# Patient Record
Sex: MALE | Race: WHITE | Employment: OTHER | URBAN - METROPOLITAN AREA
[De-identification: names, ages, dates, MRNs, and addresses within clinical notes are randomized per-mention and may not be internally consistent; named-entity substitution may affect disease eponyms.]

---

## 2017-01-01 ENCOUNTER — HOSPITAL ENCOUNTER (OUTPATIENT)
Dept: PHYSICAL THERAPY | Age: 72
Discharge: HOME | End: 2017-08-29
Payer: MEDICARE

## 2017-01-01 PROCEDURE — G8980 MOBILITY D/C STATUS: HCPCS

## 2017-01-01 PROCEDURE — 97165 OT EVAL LOW COMPLEX 30 MIN: CPT

## 2017-01-01 PROCEDURE — G8979 MOBILITY GOAL STATUS: HCPCS

## 2017-01-01 PROCEDURE — G8978 MOBILITY CURRENT STATUS: HCPCS

## 2017-05-17 PROBLEM — I50.9 CONGESTIVE HEART DISEASE (HCC): Status: ACTIVE | Noted: 2017-01-01

## 2017-05-17 PROBLEM — I50.9 CHF (CONGESTIVE HEART FAILURE) (HCC): Status: ACTIVE | Noted: 2017-01-01

## 2017-05-17 PROBLEM — R07.9 CHEST PAIN: Status: ACTIVE | Noted: 2017-01-01

## 2017-08-04 PROBLEM — I50.9 CONGESTIVE HEART FAILURE (CHF) (HCC): Status: ACTIVE | Noted: 2017-01-01

## 2017-08-29 NOTE — PROGRESS NOTES
[facility]  [facility] [address]  [phone] fax    Plan of Care/Statement of Necessity for Occupational Therapy Services    Patient name: Sueellen Lanes Start of Care: 2017   Referral source: Scottie Rubio MD : 1945    Medical Diagnosis: Decreased mobility [R26.89]  Chronic obstructive pulmonary disease, unspecified [J44.9]  Type 2 diabetes mellitus with hyperglycemia [E11.65]   Onset Date:    Treatment Diagnosis: Difficulty walking   Prior Hospitalization: see medical history Provider#: [id number]   Medications: Verified on Patient summary List    Comorbidities: CHF, COPD, Gout, arthritis R knee,    Prior Level of Function: Declining mobility due to increasing heart disease and COPD          The Plan of Care and following information is based on the information from the initial evaluation. Assessment/ key information: 74year old RHD male with long history of cardiac disease with multiple MIs and current ejection fraction of 30-35. He additionally has COPD as well as arthritis in R knee for which he has received steroid shots without relief. He is not a candidate for surgery due to cardiac disease. He has gout in B hands and wrists and well as ankles which flares at times. His current pain is 9/10 in knee, 3/10 in shoulders and elbows, and wrist/hand pain which can be 10/10 when gout strikes. He has limited knee extension in r knee to -25. His UE strength is 3-3+/5 but  is poor at R 30#, and L 20#. His lower extremity strength is 2+-3/5. He has difficulty coming to stand from seated position. He currently uses a rollator but becomes short of breath and unstable after 5-10 feet. His gait is antalgic and his knee gives way despite bracing. He has difficulty getting to bathroom in timely manner due to pain and shortness of breath as well as difficulty coming to stand from rollator.   He is no longer safe in ambulation with any device due to pain, weakness in all extremities, dyspnea and cardiac disease. He cannot push a manual wheelchair due to strength deficits and cardiopulmonary disease as well as obesity. He cannot use a scooter as keeping his arms at shoulder level increases cardiac and pulmonary demands on an already stressed system. He also has gout and limited  strength which makes use of scooter controls difficult. He requires a power wheelchair to allow him to move through his home safely and independently to retrieve items for dressing, prepare meals, use bathroom for bathing and toileting and perform routine household chores such as washing dishes and doing laundry. Without a power wheelchair he will continue to have difficulty getting to the bathroom in time, and will rely on an unsafe office chair with no brakes to gather items for dressing and bathing and move about for home care tasks as he cannot tolerate standing and the rollator cannot be used to move in sitting well.       Evaluation Complexity: History LOW Complexity : Brief history review  Examination LOW Complexity : 1-3 performance deficits relating to physical, cognitive , or psychosocial skils that result in activity limitations and / or participation restrictions  Clinical Decision Making LOW Complexity : No comorbidities that affect functional and no verbal or physical assistance needed to complete eval tasks   Overall Complexity Rating: LOW     Problem List: Pain effecting function, Decreased range of motion, Decreased strength, Decreased ADL/functional abilities , Decreased activity tolerance and Decreased transfer abilities     Treatment Plan may include any combination of the following: Patient education    Patient / Family readiness to learn indicated by: asking questions, trying to perform skills and interest    Persons(s) to be included in education:   patient (P)    Barriers to Learning/Limitations: None    Patient Goal (s): Be able to get around    Patient Self Reported Health Status: poor    Rehabilitation Potential: fair    Short Term Goals: To be accomplished in 1 treatments:  Evaluate patient for need for power mobility and make recommendations    Frequency / Duration: Patient to be seen 1 times per week for 1 treatments:    Patient/ Caregiver education and instruction: Diagnosis, prognosis, other order process  [x]  Plan of care has been reviewed with [facility]    Mobility   Current  CL= 60-79%   Goal  CL= 60-79%  D/C  CL= 60-79%      The severity rating is based on clinical judgment and the FOTO score. Certification Period: 8/29/17    Donnice Lundborg, OTR/L 8/29/2017 1:46 PM      ________________________________________________________________________    I certify that the above Therapy Services are being furnished while the patient is under my care. I agree with the treatment plan and certify that this therapy is necessary.     Physician's Signature:____________________Date:____________Time:__________    Please sign and return to [facility]  [facility]   [address] [place] [address]  [phone] fax

## 2017-08-29 NOTE — PROGRESS NOTES
OT DAILY TREATMENT NOTE - [facility]     Patient Name: Denzel Del Valle  Date:2017  : 1945  [x]  Patient  Verified  Payor: Alba Oviedo / Plan: [address] [place] [address] HMO / Product Type: Managed Care Medicare /    In time:100  Out time:125  Total Treatment Time (min): 25  Total Timed Codes (min): 0  1:1 Treatment Time ( [address] only): 25   Visit #: 1 of 1    Treatment Area: Decreased mobility [R26.89]  Chronic obstructive pulmonary disease, unspecified [J44.9]  Type 2 diabetes mellitus with hyperglycemia [E11.65]    SUBJECTIVE  Pain Level (0-10 scale): 9/10  Any medication changes, allergies to medications, adverse drug reactions, diagnosis change, or new procedure performed?: [x] No    [] Yes (see summary sheet for update)  Subjective functional status/changes:   [] No changes reported  My knee hurts all the time, but goes up to a 9/10 when I stand    OBJECTIVE  Patient seen for mobility evaluation only    With   [] TE   [] TA   [] neuro   [] other: Patient Education: [x] Review HEP    [] Progressed/Changed HEP based on:   [] positioning   [] body mechanics   [] transfers   [] heat/ice application   [] Splint wear/care   [] Sensory re-education   [] scar management      [] other: order process           Other Objective/Functional Measures:   See mobility eval     Pain Level (0-10 scale) post treatment: 910    ASSESSMENT/Changes in Function:          [x]  See Plan of Care  []  See progress note/recertification  []  See Discharge Summary           PLAN  []  Upgrade activities as tolerated     []  Continue plan of care  []  Update interventions per flow sheet       []  Discharge due to:_  []  Other:_      THANIA Ambrose/L 2017  1:43 PM    No future appointments.

## 2017-09-21 PROBLEM — R06.00 DYSPNEA: Status: ACTIVE | Noted: 2017-01-01

## 2017-09-21 PROBLEM — R77.8 ELEVATED TROPONIN: Status: ACTIVE | Noted: 2017-01-01

## 2017-09-21 PROBLEM — I50.9 ACUTE EXACERBATION OF CHF (CONGESTIVE HEART FAILURE) (HCC): Status: ACTIVE | Noted: 2017-01-01

## 2017-09-21 PROBLEM — I50.9 CHF EXACERBATION (HCC): Status: ACTIVE | Noted: 2017-01-01

## 2017-11-13 PROBLEM — J96.01 ACUTE HYPOXEMIC RESPIRATORY FAILURE (HCC): Status: ACTIVE | Noted: 2017-01-01

## 2017-12-06 PROBLEM — J96.01 ACUTE RESPIRATORY FAILURE WITH HYPOXIA (HCC): Status: ACTIVE | Noted: 2017-01-01

## 2017-12-06 PROBLEM — I50.9 ACUTE CONGESTIVE HEART FAILURE (HCC): Status: ACTIVE | Noted: 2017-01-01

## 2017-12-06 PROBLEM — N17.9 ACUTE KIDNEY INJURY (HCC): Status: ACTIVE | Noted: 2017-01-01

## 2017-12-21 PROBLEM — R77.8 ELEVATED TROPONIN: Chronic | Status: ACTIVE | Noted: 2017-01-01

## 2017-12-21 PROBLEM — I50.9 ACUTE CONGESTIVE HEART FAILURE (HCC): Chronic | Status: ACTIVE | Noted: 2017-01-01

## 2017-12-21 PROBLEM — I35.0 MODERATE AORTIC VALVE STENOSIS: Chronic | Status: ACTIVE | Noted: 2017-01-01

## 2017-12-21 PROBLEM — I50.23 ACUTE ON CHRONIC SYSTOLIC CONGESTIVE HEART FAILURE (HCC): Status: ACTIVE | Noted: 2017-01-01
